# Patient Record
Sex: FEMALE | Race: BLACK OR AFRICAN AMERICAN
[De-identification: names, ages, dates, MRNs, and addresses within clinical notes are randomized per-mention and may not be internally consistent; named-entity substitution may affect disease eponyms.]

---

## 2018-01-24 ENCOUNTER — HOSPITAL ENCOUNTER (OUTPATIENT)
Dept: HOSPITAL 88 - CT | Age: 63
End: 2018-01-24
Attending: INTERNAL MEDICINE
Payer: COMMERCIAL

## 2018-01-24 DIAGNOSIS — R91.1: Primary | ICD-10-CM

## 2018-01-24 PROCEDURE — 71250 CT THORAX DX C-: CPT

## 2018-01-24 NOTE — DIAGNOSTIC IMAGING REPORT
PROCEDURE: CT CHEST WITHOUT CONTRAST

CT scan of the chest WITHOUT intravenous contrast, using low dose, 

nodule protocol.

 

TECHNIQUE:

The chest was scanned utilizing a multidetector helical scanner from 

the apex to the level of the adrenal glands.  No IV contrast was 

administered per low dose nodule protocol Coronal and sagittal 

multiplanar reformations were obtained.

 

COMPARISON: [facility], CT, CT CHEST WO, 4/07/2017, 10:36.

 

INDICATIONS:    LUNG NODULE FOLLOW UP, HEART MURMUR

     

FINDINGS:

Lines/tubes:  None.

 

Lungs and Airways: Stable 4-5 mm nodular density in the left upper lobe 

(series 3, image 29).

No other pulmonary nodules, no masses or consolidation.

Linear opacities in the lingula (series 3, image 56) anterior left 

lower lobe (series 3, image 85) and mediastinal right lower lobe 

(series 3, image 85), consistent with scarring.

Airways are clear, without endobronchial lesions.

 

Pleura: No effusion, or pneumothorax

 

Heart and mediastinum: High attenuation 1.7 x 2.0 x 2.7 cm structure in 

the right upper mediastinum appears to be contiguous with the right 

lower thyroid lobe, is stable since the prior exam, and likely 

represents a thyroid nodule or substernal goiter (series 2, image 15, 

and coronal image 59).. Mild to moderate cardiomegaly. Small 

pericardial effusion. The aorta is non-aneurysmal. The main pulmonary 

artery is enlarged, measuring approximately 3.2 cm.

 

Lymph nodes: No mediastinal, hilar, or axillary adenopathy.

Abdomen: Limited views of the upper abdomen show no abnormality within 

the visualized liver, spleen, pancreas, or right kidney. Partially 

visualized, relatively stable 4.6 x 4.6 cm fluid density lesion in the 

superior pole of the left kidney, consistent with a simple cyst (series 

2, image 119). Cholecystectomy clips. The adrenal glands are normal.

 

Bones: No aggressive lytic lesions. Degenerative changes in the 

thoracic spine. Soft tissues are unremarkable.

 

IMPRESSION: 

 

1. stable 4-5 mm nodule in the left upper lobe since exam dated 

4/7/2017. No further followup is indicated per [employer] 2017 

guidelines.

2. Stable scarring in the lingula and bilateral lower lobes. No 

consolidation or effusion.

3. Mild to moderate cardiomegaly with small pericardial effusion.

4. Stable 2.7 cm high attenuation structure which appears to be 

contiguous with the inferior pole of the right thyroid lobe and likely 

represents an exophytic thyroid nodule or substernal goiter. Dedicated 

thyroid ultrasound may be obtained for further evaluation.

5. Mild enlargement of the pulmonary artery, likely reflecting 

pulmonary hypertension.

 

 

Andrews Childress M.D.  

Dictated by:  Andrews Childress M.D. on 1/24/2018 at 19:28     

Electronically approved by:  Andrews Childress M.D. on 1/24/2018 at 

19:28